# Patient Record
Sex: MALE | Race: ASIAN | NOT HISPANIC OR LATINO | URBAN - METROPOLITAN AREA
[De-identification: names, ages, dates, MRNs, and addresses within clinical notes are randomized per-mention and may not be internally consistent; named-entity substitution may affect disease eponyms.]

---

## 2023-04-23 ENCOUNTER — INPATIENT (INPATIENT)
Facility: HOSPITAL | Age: 68
LOS: 0 days | Discharge: HOME | DRG: 312 | End: 2023-04-24
Attending: STUDENT IN AN ORGANIZED HEALTH CARE EDUCATION/TRAINING PROGRAM | Admitting: STUDENT IN AN ORGANIZED HEALTH CARE EDUCATION/TRAINING PROGRAM
Payer: SELF-PAY

## 2023-04-23 VITALS
RESPIRATION RATE: 17 BRPM | HEIGHT: 68 IN | HEART RATE: 60 BPM | SYSTOLIC BLOOD PRESSURE: 139 MMHG | OXYGEN SATURATION: 97 % | WEIGHT: 169.98 LBS | DIASTOLIC BLOOD PRESSURE: 65 MMHG | TEMPERATURE: 98 F

## 2023-04-23 DIAGNOSIS — R55 SYNCOPE AND COLLAPSE: ICD-10-CM

## 2023-04-23 LAB
ALBUMIN SERPL ELPH-MCNC: 4.1 G/DL — SIGNIFICANT CHANGE UP (ref 3.3–5)
ALP SERPL-CCNC: 96 U/L — SIGNIFICANT CHANGE UP (ref 40–120)
ALT FLD-CCNC: 9 U/L — LOW (ref 10–45)
ANION GAP SERPL CALC-SCNC: 9 MMOL/L — SIGNIFICANT CHANGE UP (ref 5–17)
APPEARANCE UR: CLEAR — SIGNIFICANT CHANGE UP
APTT BLD: 23.5 SEC — LOW (ref 27.5–35.5)
AST SERPL-CCNC: 26 U/L — SIGNIFICANT CHANGE UP (ref 10–40)
BACTERIA # UR AUTO: NEGATIVE — SIGNIFICANT CHANGE UP
BASOPHILS # BLD AUTO: 0.04 K/UL — SIGNIFICANT CHANGE UP (ref 0–0.2)
BASOPHILS NFR BLD AUTO: 0.6 % — SIGNIFICANT CHANGE UP (ref 0–2)
BILIRUB SERPL-MCNC: 0.8 MG/DL — SIGNIFICANT CHANGE UP (ref 0.2–1.2)
BILIRUB UR-MCNC: NEGATIVE — SIGNIFICANT CHANGE UP
BLD GP AB SCN SERPL QL: NEGATIVE — SIGNIFICANT CHANGE UP
BUN SERPL-MCNC: 22 MG/DL — SIGNIFICANT CHANGE UP (ref 7–23)
CALCIUM SERPL-MCNC: 9.5 MG/DL — SIGNIFICANT CHANGE UP (ref 8.4–10.5)
CHLORIDE SERPL-SCNC: 102 MMOL/L — SIGNIFICANT CHANGE UP (ref 96–108)
CO2 SERPL-SCNC: 27 MMOL/L — SIGNIFICANT CHANGE UP (ref 22–31)
COLOR SPEC: SIGNIFICANT CHANGE UP
CREAT SERPL-MCNC: 0.78 MG/DL — SIGNIFICANT CHANGE UP (ref 0.5–1.3)
DIFF PNL FLD: NEGATIVE — SIGNIFICANT CHANGE UP
EGFR: 97 ML/MIN/1.73M2 — SIGNIFICANT CHANGE UP
EOSINOPHIL # BLD AUTO: 0.1 K/UL — SIGNIFICANT CHANGE UP (ref 0–0.5)
EOSINOPHIL NFR BLD AUTO: 1.4 % — SIGNIFICANT CHANGE UP (ref 0–6)
EPI CELLS # UR: 1 /HPF — SIGNIFICANT CHANGE UP
GLUCOSE SERPL-MCNC: 112 MG/DL — HIGH (ref 70–99)
GLUCOSE UR QL: NEGATIVE — SIGNIFICANT CHANGE UP
HCT VFR BLD CALC: 47.4 % — SIGNIFICANT CHANGE UP (ref 39–50)
HGB BLD-MCNC: 15.6 G/DL — SIGNIFICANT CHANGE UP (ref 13–17)
HYALINE CASTS # UR AUTO: 1 /LPF — SIGNIFICANT CHANGE UP (ref 0–2)
IMM GRANULOCYTES NFR BLD AUTO: 0.4 % — SIGNIFICANT CHANGE UP (ref 0–0.9)
INR BLD: 0.95 RATIO — SIGNIFICANT CHANGE UP (ref 0.88–1.16)
KETONES UR-MCNC: SIGNIFICANT CHANGE UP
LEUKOCYTE ESTERASE UR-ACNC: NEGATIVE — SIGNIFICANT CHANGE UP
LYMPHOCYTES # BLD AUTO: 2.98 K/UL — SIGNIFICANT CHANGE UP (ref 1–3.3)
LYMPHOCYTES # BLD AUTO: 42.9 % — SIGNIFICANT CHANGE UP (ref 13–44)
MCHC RBC-ENTMCNC: 31.5 PG — SIGNIFICANT CHANGE UP (ref 27–34)
MCHC RBC-ENTMCNC: 32.9 GM/DL — SIGNIFICANT CHANGE UP (ref 32–36)
MCV RBC AUTO: 95.8 FL — SIGNIFICANT CHANGE UP (ref 80–100)
MONOCYTES # BLD AUTO: 0.41 K/UL — SIGNIFICANT CHANGE UP (ref 0–0.9)
MONOCYTES NFR BLD AUTO: 5.9 % — SIGNIFICANT CHANGE UP (ref 2–14)
NEUTROPHILS # BLD AUTO: 3.38 K/UL — SIGNIFICANT CHANGE UP (ref 1.8–7.4)
NEUTROPHILS NFR BLD AUTO: 48.8 % — SIGNIFICANT CHANGE UP (ref 43–77)
NITRITE UR-MCNC: NEGATIVE — SIGNIFICANT CHANGE UP
NRBC # BLD: 0 /100 WBCS — SIGNIFICANT CHANGE UP (ref 0–0)
PH UR: 6.5 — SIGNIFICANT CHANGE UP (ref 5–8)
PLATELET # BLD AUTO: 173 K/UL — SIGNIFICANT CHANGE UP (ref 150–400)
POTASSIUM SERPL-MCNC: 4.4 MMOL/L — SIGNIFICANT CHANGE UP (ref 3.5–5.3)
POTASSIUM SERPL-SCNC: 4.4 MMOL/L — SIGNIFICANT CHANGE UP (ref 3.5–5.3)
PROT SERPL-MCNC: 7.3 G/DL — SIGNIFICANT CHANGE UP (ref 6–8.3)
PROT UR-MCNC: ABNORMAL
PROTHROM AB SERPL-ACNC: 11 SEC — SIGNIFICANT CHANGE UP (ref 10.5–13.4)
RBC # BLD: 4.95 M/UL — SIGNIFICANT CHANGE UP (ref 4.2–5.8)
RBC # FLD: 13.3 % — SIGNIFICANT CHANGE UP (ref 10.3–14.5)
RBC CASTS # UR COMP ASSIST: 2 /HPF — SIGNIFICANT CHANGE UP (ref 0–4)
RH IG SCN BLD-IMP: POSITIVE — SIGNIFICANT CHANGE UP
SODIUM SERPL-SCNC: 138 MMOL/L — SIGNIFICANT CHANGE UP (ref 135–145)
SP GR SPEC: 1.02 — SIGNIFICANT CHANGE UP (ref 1.01–1.02)
UROBILINOGEN FLD QL: NEGATIVE — SIGNIFICANT CHANGE UP
WBC # BLD: 6.94 K/UL — SIGNIFICANT CHANGE UP (ref 3.8–10.5)
WBC # FLD AUTO: 6.94 K/UL — SIGNIFICANT CHANGE UP (ref 3.8–10.5)
WBC UR QL: 2 /HPF — SIGNIFICANT CHANGE UP (ref 0–5)

## 2023-04-23 PROCEDURE — 71045 X-RAY EXAM CHEST 1 VIEW: CPT | Mod: 26

## 2023-04-23 PROCEDURE — 72170 X-RAY EXAM OF PELVIS: CPT | Mod: 26

## 2023-04-23 PROCEDURE — 99285 EMERGENCY DEPT VISIT HI MDM: CPT

## 2023-04-23 PROCEDURE — 70450 CT HEAD/BRAIN W/O DYE: CPT | Mod: 26,MA

## 2023-04-23 PROCEDURE — 70450 CT HEAD/BRAIN W/O DYE: CPT | Mod: 26,MA,77

## 2023-04-23 PROCEDURE — 72125 CT NECK SPINE W/O DYE: CPT | Mod: 26,MA

## 2023-04-23 PROCEDURE — 71260 CT THORAX DX C+: CPT | Mod: 26,MA

## 2023-04-23 PROCEDURE — 74177 CT ABD & PELVIS W/CONTRAST: CPT | Mod: 26,MA

## 2023-04-23 NOTE — ED ADULT NURSE NOTE - OBJECTIVE STATEMENT
Patient is a 68 year old male brought in by EMS for a reported syncope and fall with [place]. On arrival patient is A&Ox3, does not recall details about why he is here, unsure about his medical history, unclear if this is his baseline. Patient complaining of neck pain at this time. On assessment Neuro intact, breathing comfortably on room air, no accessory muscles being used, c-collar in place, abrasion noted to back of head - no active bleeding, moving all extremities well, abdomen is soft, skin is warm and dry. Patient denies headache, dizziness, chest pain, palpitations, cough, SOB, abdominal pain, n/v/d, urinary symptoms, fevers, chills, weakness at this time. Blood sugar 97.

## 2023-04-23 NOTE — CONSULT NOTE ADULT - SUBJECTIVE AND OBJECTIVE BOX
Meliton, Lamhi  68M no AC/AP, hx of Parkinson's p/w syncope and LOC, with CT head showing small interhemisphic hyperdensity. PLT/Coags wnl. Exam: intact      --Anticoagulation--    T(C): 36.7 (04-23-23 @ 13:03), Max: 36.7 (04-23-23 @ 13:03)  HR: 88 (04-23-23 @ 19:18) (60 - 90)  BP: 168/72 (04-23-23 @ 19:18) (139/65 - 179/107)  RR: 19 (04-23-23 @ 19:18) (17 - 19)  SpO2: 98% (04-23-23 @ 19:18) (97% - 99%)  Wt(kg): --    Exam: AO3, PERRL, EOMI, ELIZONDO 5/5, SILT

## 2023-04-23 NOTE — ED PROVIDER NOTE - PHYSICAL EXAMINATION
CONSTITUTIONAL: Well-developed; well-nourished; in no acute distress.   SKIN: warm, dry  HEAD: Normocephalic; abrasion posterior head   EYES: no conjunctival injection. PERRL. EOMI   ENT: No nasal discharge; airway clear.  NECK: No JVD  CARD: S1, S2 normal; Regular rate and rhythm.   RESP: No wheezes, rales or rhonchi. Good air movement bilaterally.   ABD: soft ntnd, no guarding, no distention, no rigidity.   EXT:  No cyanosis or edema.   NEURO: AOx3. AOx3, CN III-XII intact. Motor strength 5/5 in all extremities. Sensation intact throughout.  PSYCH: Cooperative, appropriate.

## 2023-04-23 NOTE — ED PROVIDER NOTE - ATTENDING CONTRIBUTION TO CARE
Attending Statement (RULA Loaiza MD):    HPI: 68-year-old male presenting after reported syncope/loss of consciousness and falling backward, with head strike on the ground.  Arrives by EMS with c-collar in place.  Patient reportedly confused per EMS.  Upon arrival A&O x3.  Patient has no recollection of the event.  No history of cardiac disease.    Review of Systems:  -General: no fever   -ENT: no congestion  -Pulmonary: no cough, no shortness of breath  -Cardiac: no chest pain; + syncope  -Gastrointestinal: no abdominal pain, no nausea, no vomiting, and no diarrhea.  -Genitourinary: no blood or pain with urination  -Musculoskeletal: no back or neck pain  -Skin: no rashes  -Endocrine: No h/o diabetes  -Neurologic: No new weakness or numbness in extremities    All else negative unless otherwise specified elsewhere in this note.    On Physical Exam:  General: well appearing, in NAD, speaking clearly in full sentences and without difficulty; cooperative with exam  HEENT: anicteric sclera, airway patent  Neck: no JVD  Cardiac: regular, s1 s2  Lungs: CTABL  Abdomen: soft nontender/nondistended  : no bladder tenderness or distension  Skin: intact, no rash  Extremities: no peripheral edema, no gross deformities      MDM: 68M presenting after reported syncope and fall with + head trauma; patient does not recall events; cannot report medical history or medications; but is A&Ox3 though having trouble giving details regarding his history; unclear if this is baseline, no collateral information; attempted/offered Cantonese intepreter but declined. Will obtain CT head/c-spine, chest/abdomen/pelvis to evaluate fo traumatic injuries, and labs: cbc (to evaluate for leukocytosis or anemia), CMP (to evaluate for electrolyte abnormalities or renal/liver dysfunction), troponin (eval for acs), and ua. Disposition pending review of labs/imaging. Please see above progress notes above for updates to medical decision making and the patient's clinical course.

## 2023-04-23 NOTE — ED ADULT NURSE NOTE - NSIMPLEMENTINTERV_GEN_ALL_ED
Implemented All Fall Risk Interventions:  [place] to call system. Call bell, personal items and telephone within reach. Instruct patient to call for assistance. Room bathroom lighting operational. Non-slip footwear when patient is off stretcher. Physically safe environment: no spills, clutter or unnecessary equipment. Stretcher in lowest position, wheels locked, appropriate side rails in place. Provide visual cue, wrist band, yellow gown, etc. Monitor gait and stability. Monitor for mental status changes and reorient to person, place, and time. Review medications for side effects contributing to fall risk. Reinforce activity limits and safety measures with patient and family.

## 2023-04-23 NOTE — ED PROVIDER NOTE - OBJECTIVE STATEMENT
Hazel DO PGY-3:  68-year-old male brought in by EMS secondary to a witnessed fall on the street.  Positive LOC.  Patient arrives awake and alert.  Patient is unsure of his medical history or what medications he takes daily.  Patient does not know the contact information of any of his family.  Blood glucose normal on evaluation.  Patient arrives in c-collar.

## 2023-04-23 NOTE — CONSULT NOTE ADULT - ASSESSMENT
Meliton Maribell  68M no AC/AP, hx of Parkinson's p/w syncope and LOC, with CT head showing small interhemisphic hyperdensity. PLT/Coags wnl. Exam: intact    -admit to medicine for syncope w/u  -repeat CT head in 4 hours.   -If repeat CT stable, patient can FU with Dr. Best as outpatient in 1-2 weeks from [place]  -Ok to start DVT ppx 24 hrs after 12-24 hr stability scan

## 2023-04-23 NOTE — ED PROVIDER NOTE - PROGRESS NOTE DETAILS
O'Nima DO PGY-3: consulted neurosurg. Ordered interval scan at 650pm. O'Nima DO PGY-3: daughter at bedside. Updated her on plan. States she will stay w/ pt in ED Vinh Bishop, PGY-3- pt received at sign out. Repeat CT with stable findings. Pt at baseline. Walked with assistance, unsteady gait. Daughter reporting hx of Parkinson's and on meds from [facility]. Ok for daughter to give pt evening meds but advised no further meds without discussing w primary team. Pt to stay for syncope work up and PT eval. Pt and daughter in agreement

## 2023-04-23 NOTE — ED PROVIDER NOTE - CLINICAL SUMMARY MEDICAL DECISION MAKING FREE TEXT BOX
Hazel DO PGY-3:  68-year-old male brought in by EMS secondary to a witnessed fall on the street.  Positive LOC.  Patient arrives awake and alert.  Patient is unsure of his medical history or what medications he takes daily.  Patient does not know the contact information of any of his family.  Given the lack of prior medical history information or collateral available will obtain pan trauma scan.  We will screen for electrolyte abnormalities.  We will send off cardiac lab screening given syncope.  Dispo pending work-up.

## 2023-04-24 VITALS
HEART RATE: 76 BPM | DIASTOLIC BLOOD PRESSURE: 79 MMHG | RESPIRATION RATE: 18 BRPM | TEMPERATURE: 98 F | SYSTOLIC BLOOD PRESSURE: 162 MMHG | OXYGEN SATURATION: 96 %

## 2023-04-24 DIAGNOSIS — G20 PARKINSON'S DISEASE: ICD-10-CM

## 2023-04-24 DIAGNOSIS — Z29.9 ENCOUNTER FOR PROPHYLACTIC MEASURES, UNSPECIFIED: ICD-10-CM
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
## 2023-04-24 DIAGNOSIS — R55 SYNCOPE AND COLLAPSE: ICD-10-CM

## 2023-04-24 LAB
ANION GAP SERPL CALC-SCNC: 15 MMOL/L — SIGNIFICANT CHANGE UP (ref 5–17)
BUN SERPL-MCNC: 21 MG/DL — SIGNIFICANT CHANGE UP (ref 7–23)
CALCIUM SERPL-MCNC: 9.5 MG/DL — SIGNIFICANT CHANGE UP (ref 8.4–10.5)
CHLORIDE SERPL-SCNC: 99 MMOL/L — SIGNIFICANT CHANGE UP (ref 96–108)
CO2 SERPL-SCNC: 24 MMOL/L — SIGNIFICANT CHANGE UP (ref 22–31)
CREAT SERPL-MCNC: 0.95 MG/DL — SIGNIFICANT CHANGE UP (ref 0.5–1.3)
CULTURE RESULTS: SIGNIFICANT CHANGE UP
EGFR: 87 ML/MIN/1.73M2 — SIGNIFICANT CHANGE UP
GLUCOSE SERPL-MCNC: 87 MG/DL — SIGNIFICANT CHANGE UP (ref 70–99)
INR BLD: 1.01 RATIO — SIGNIFICANT CHANGE UP (ref 0.88–1.16)
POTASSIUM SERPL-MCNC: 3.6 MMOL/L — SIGNIFICANT CHANGE UP (ref 3.5–5.3)
POTASSIUM SERPL-SCNC: 3.6 MMOL/L — SIGNIFICANT CHANGE UP (ref 3.5–5.3)
PROTHROM AB SERPL-ACNC: 11.6 SEC — SIGNIFICANT CHANGE UP (ref 10.5–13.4)
SODIUM SERPL-SCNC: 138 MMOL/L — SIGNIFICANT CHANGE UP (ref 135–145)
SPECIMEN SOURCE: SIGNIFICANT CHANGE UP
TSH SERPL-MCNC: 0.76 UIU/ML — SIGNIFICANT CHANGE UP (ref 0.27–4.2)

## 2023-04-24 PROCEDURE — 86901 BLOOD TYPING SEROLOGIC RH(D): CPT

## 2023-04-24 PROCEDURE — 99223 1ST HOSP IP/OBS HIGH 75: CPT

## 2023-04-24 PROCEDURE — 84484 ASSAY OF TROPONIN QUANT: CPT

## 2023-04-24 PROCEDURE — 72125 CT NECK SPINE W/O DYE: CPT | Mod: MA

## 2023-04-24 PROCEDURE — 71260 CT THORAX DX C+: CPT | Mod: MA

## 2023-04-24 PROCEDURE — 81001 URINALYSIS AUTO W/SCOPE: CPT

## 2023-04-24 PROCEDURE — 12345: CPT | Mod: NC

## 2023-04-24 PROCEDURE — 93306 TTE W/DOPPLER COMPLETE: CPT

## 2023-04-24 PROCEDURE — 83880 ASSAY OF NATRIURETIC PEPTIDE: CPT

## 2023-04-24 PROCEDURE — 72170 X-RAY EXAM OF PELVIS: CPT

## 2023-04-24 PROCEDURE — 86850 RBC ANTIBODY SCREEN: CPT

## 2023-04-24 PROCEDURE — 93306 TTE W/DOPPLER COMPLETE: CPT | Mod: 26

## 2023-04-24 PROCEDURE — 99285 EMERGENCY DEPT VISIT HI MDM: CPT

## 2023-04-24 PROCEDURE — 85025 COMPLETE CBC W/AUTO DIFF WBC: CPT

## 2023-04-24 PROCEDURE — 80048 BASIC METABOLIC PNL TOTAL CA: CPT

## 2023-04-24 PROCEDURE — 85610 PROTHROMBIN TIME: CPT

## 2023-04-24 PROCEDURE — 70450 CT HEAD/BRAIN W/O DYE: CPT | Mod: MA

## 2023-04-24 PROCEDURE — 70450 CT HEAD/BRAIN W/O DYE: CPT | Mod: 26

## 2023-04-24 PROCEDURE — 80053 COMPREHEN METABOLIC PANEL: CPT

## 2023-04-24 PROCEDURE — 85730 THROMBOPLASTIN TIME PARTIAL: CPT

## 2023-04-24 PROCEDURE — 87086 URINE CULTURE/COLONY COUNT: CPT

## 2023-04-24 PROCEDURE — 86900 BLOOD TYPING SEROLOGIC ABO: CPT

## 2023-04-24 PROCEDURE — 82962 GLUCOSE BLOOD TEST: CPT

## 2023-04-24 PROCEDURE — 97161 PT EVAL LOW COMPLEX 20 MIN: CPT

## 2023-04-24 PROCEDURE — 93356 MYOCRD STRAIN IMG SPCKL TRCK: CPT

## 2023-04-24 PROCEDURE — 84443 ASSAY THYROID STIM HORMONE: CPT

## 2023-04-24 PROCEDURE — 74177 CT ABD & PELVIS W/CONTRAST: CPT | Mod: MA

## 2023-04-24 PROCEDURE — 71045 X-RAY EXAM CHEST 1 VIEW: CPT

## 2023-04-24 RX ORDER — PRAMIPEXOLE DIHYDROCHLORIDE 0.12 MG/1
3 TABLET ORAL DAILY
Refills: 0 | Status: DISCONTINUED | OUTPATIENT
Start: 2023-04-24 | End: 2023-04-24

## 2023-04-24 RX ORDER — SIMVASTATIN 20 MG/1
1 TABLET, FILM COATED ORAL
Refills: 0 | DISCHARGE

## 2023-04-24 RX ORDER — LANOLIN ALCOHOL/MO/W.PET/CERES
3 CREAM (GRAM) TOPICAL AT BEDTIME
Refills: 0 | Status: DISCONTINUED | OUTPATIENT
Start: 2023-04-24 | End: 2023-04-24

## 2023-04-24 RX ORDER — CARBIDOPA AND LEVODOPA 25; 100 MG/1; MG/1
1 TABLET ORAL
Refills: 0 | Status: DISCONTINUED | OUTPATIENT
Start: 2023-04-24 | End: 2023-04-24

## 2023-04-24 RX ORDER — ACETAMINOPHEN 500 MG
650 TABLET ORAL EVERY 6 HOURS
Refills: 0 | Status: DISCONTINUED | OUTPATIENT
Start: 2023-04-24 | End: 2023-04-24

## 2023-04-24 RX ORDER — ATORVASTATIN CALCIUM 80 MG/1
40 TABLET, FILM COATED ORAL AT BEDTIME
Refills: 0 | Status: DISCONTINUED | OUTPATIENT
Start: 2023-04-24 | End: 2023-04-24

## 2023-04-24 RX ORDER — AMLODIPINE BESYLATE 2.5 MG/1
2.5 TABLET ORAL DAILY
Refills: 0 | Status: DISCONTINUED | OUTPATIENT
Start: 2023-04-24 | End: 2023-04-24

## 2023-04-24 RX ORDER — ENOXAPARIN SODIUM 100 MG/ML
40 INJECTION SUBCUTANEOUS EVERY 24 HOURS
Refills: 0 | Status: DISCONTINUED | OUTPATIENT
Start: 2023-04-24 | End: 2023-04-24

## 2023-04-24 RX ORDER — ONDANSETRON 8 MG/1
4 TABLET, FILM COATED ORAL EVERY 8 HOURS
Refills: 0 | Status: DISCONTINUED | OUTPATIENT
Start: 2023-04-24 | End: 2023-04-24

## 2023-04-24 RX ORDER — PRAMIPEXOLE DIHYDROCHLORIDE 0.12 MG/1
1 TABLET ORAL THREE TIMES A DAY
Refills: 0 | Status: DISCONTINUED | OUTPATIENT
Start: 2023-04-24 | End: 2023-04-24

## 2023-04-24 RX ORDER — PRAMIPEXOLE DIHYDROCHLORIDE 0.12 MG/1
2 TABLET ORAL
Refills: 0 | DISCHARGE

## 2023-04-24 RX ADMIN — CARBIDOPA AND LEVODOPA 1 TABLET(S): 25; 100 TABLET ORAL at 11:08

## 2023-04-24 RX ADMIN — PRAMIPEXOLE DIHYDROCHLORIDE 1 MILLIGRAM(S): 0.12 TABLET ORAL at 13:28

## 2023-04-24 RX ADMIN — AMLODIPINE BESYLATE 2.5 MILLIGRAM(S): 2.5 TABLET ORAL at 06:41

## 2023-04-24 NOTE — PHYSICAL THERAPY INITIAL EVALUATION ADULT - GAIT DEVIATIONS NOTED, PT EVAL
progressive increase in step length and reduced shuffling (h/o parkinson's), walking and talking, no LOB, safe/steady gait

## 2023-04-24 NOTE — PHYSICAL THERAPY INITIAL EVALUATION ADULT - GENERAL OBSERVATIONS, REHAB EVAL
pt shyla 30 min eval well. pt rec'd in stretcher, tele, NAD, agreed to session, following all commands. orthostatics assessed (see vitals sheet). see initial evaluation document for functional assessment. pt ambulated well around unit without any device. pt without c/o chest pain, SOB, or dizziness. Pt left in stretcher, rails up, RN aware, NAD, all lines intact, cb in reach, all needs met/5Ps.

## 2023-04-24 NOTE — PHYSICAL THERAPY INITIAL EVALUATION ADULT - ADDITIONAL COMMENTS
pt states he lives in the apartment of a private home with spouse with a flight of steps (+handrail). Pt states prior to admission being independent with all functional mobility and ADLs. pt denies owning any DME.

## 2023-04-24 NOTE — H&P ADULT - PROBLEM SELECTOR PLAN 3
- c/w Pramipexole  - Madopar ( home med, non formulary)   >> Will given Carbidopa-Levodopa while admitted - c/w Pramipexole  - Madopar ( home med, non formulary)   >> Will give Carbidopa-Levodopa while admitted      Pt noted to have persistently elevated BP O/N no reported hx/o HTN, will start Amlodipine 2.5mg

## 2023-04-24 NOTE — H&P ADULT - PROBLEM SELECTOR PLAN 1
Presents s/p witnessed fall with +LOC   - Possible etiology: orthostatic vs cardiac dz vs electrolyte abnorm vs neuro dz vs infection  - EKG: NSR w/ 1st AVB  - Orthostatic: Check orthostatic bp, maintain adequate hydration, change position slowly  - Cardiac: trop & BNP wnl, tele monitor to asses arrythmia, check echo to eval  structural  or valvular abnormalities (ordered). Will also check tsh   - Electrolyte: review of electrolytes largely wnl except, less likely etiology   - Neuro:  no focal neuro deficit, CT head notable for midline focus , possible small acute hemorrhage   vs meningioma. Findings stable on repeat imaging.  Loss suspicion for ICH, monitor clinically, neuro check   - Infection: Afebrile, no leukocytosis, UA non infectious, CXR clear, infection unlikely Presents s/p witnessed fall with +LOC   - Possible etiology: orthostatic vs cardiac dz vs electrolyte abnorm vs neuro dz vs infection  - EKG: NSR w/ 1st AVB  - Orthostatic: Check orthostatic bp, maintain adequate hydration, change position slowly  - Cardiac: trop & BNP wnl, tele monitor to asses arrythmia, check echo to eval  structural  or valvular abnormalities (ordered). Will also check tsh   - Electrolyte: review of electrolytes largely wnl except, less likely etiology   - Neuro:  no focal neuro deficit, CT head notable for midline focus , possible small acute hemorrhage   vs meningioma. Findings stable on repeat imaging.  Loss suspicion for ICH, monitor clinically, neuro check   - Infection: Afebrile, no leukocytosis, UA noninfectious, CXR clear, infection unlikely Presents s/p witnessed fall with +LOC   - Possible etiology: orthostatic vs cardiac dz vs electrolyte abnorm vs neuro dz vs infection  - EKG: NSR w/ 1st AVB  - Orthostatic: Check orthostatic bp, maintain adequate hydration, change position slowly  - Cardiac: trop & BNP wnl, tele monitor to asses arrythmia, check echo to eval  structural  or valvular abnormalities (ordered). Will also check tsh   - Electrolyte: review of electrolytes largely wnl except, less likely etiology   - Neuro:  no focal neuro deficit, CT head notable for midline focus , possible small acute hemorrhage   vs meningioma. Findings stable on repeat imaging.  Low suspicion for ICH, monitor clinically, neuro check   Neuro Surg  consulted, apprec rec; Ok to start DVT ppx 24 hrs after 12-24 hr stability scan.    - Infection: Afebrile, no leukocytosis, UA noninfectious, CXR clear, infection unlikely

## 2023-04-24 NOTE — H&P ADULT - HISTORY OF PRESENT ILLNESS
68y M pmh ? Parkinson BIBEMS s/p witnessed fall on the street w/ associated  LOC.  On arrival to ED patient awake and alert. Bg wnl, C-collar in place      68y M pmh Parkinson BIBEMS s/p witnessed fall on the street w/ associated  LOC.  Pt stating it  "it happened suddenly".   On arrival to ED patient awake and alert, offers no compliant.    Denies CP, SOB, palpitation, HA, N/V/D, fever, cough, chills, dizziness, abm pain

## 2023-04-24 NOTE — DISCHARGE NOTE PROVIDER - HOSPITAL COURSE
68y M pmh Parkinson BIBEMS s/p witnessed fall on the street w/ associated  LOC admitted for eval.  Admitted with syncopal episode.  CT Head possible small acute hemorrhage vs meningioma/ repeat CT stable.  Limited echo-WNL.  Pt also with hypertension: Norvasc started. Seen by PT.  No skilled needs.  Acute issues resolved.  DC to home.

## 2023-04-24 NOTE — PHYSICAL THERAPY INITIAL EVALUATION ADULT - NSPTDISCHREC_GEN_A_CORE
pt cleared for DC from PT standpoint. please reconsult as appropriate/if status changes/No skilled PT needs

## 2023-04-24 NOTE — H&P ADULT - PROBLEM SELECTOR PLAN 2
S/p witnessed fall in street w/ +LOC  - Possible debility given baseline ??Parkinson DZ  - Fall precaution   - PT consult  (ordered)   - Further w/u as above S/p witnessed fall in street w/ +LOC  - Possible debility given baseline Parkinson DZ  - Fall precaution   - PT consult  (ordered)   - Further w/u as above S/p witnessed fall in street w/ +LOC  - CT head notable for midline focus , possible small acute hemorrhage   vs meningioma. Findings stable on repeat imaging.  Loss suspicion for ICH, monitor clinically, neuro check   - Neuro Surg  consulted, apprec rec; Ok to start DVT ppx 24 hrs after 12-24 hr stability scan.  - Repeat CT head ordered (TO BE  PERFORMED AFTER 9AM on 4/24)   - Possible debility given baseline Parkinson Dz  - Fall precaution   - PT consult  (ordered)   - Further w/u as above

## 2023-04-24 NOTE — DISCHARGE NOTE NURSING/CASE MANAGEMENT/SOCIAL WORK - PATIENT PORTAL LINK FT
You can access the FollowMyHealth Patient Portal offered by [facility] by registering at the following website: [email or web address]. By joining [facility]’s FollowMyHealth portal, you will also be able to view your health information using other applications (apps) compatible with our system.

## 2023-04-24 NOTE — H&P ADULT - PROBLEM SELECTOR PLAN 4
- DVT ppx: Lovenox SQ  - Diet: DASH  - PT consult - DVT ppx: held for  now, to be resumed 24hr after stability scan   - Diet: DASH  - PT consult

## 2023-04-24 NOTE — CHART NOTE - NSCHARTNOTEFT_GEN_A_CORE
SECOND TOUCH SECOND TOUCH    Appreciate excellent care provided by briana.    aMrilu : [phone]    Patient seen and examined at bedside. No acute events overnight. No complaints currently. Unable to further provide background into the circumstances of fall. Unsure if there were prodromal symptoms prior to fall or if this was mechanical and they he lost consciousness on impact. He does have history of this happening in [facility] and was hospitalized there without major findings (per daughter Roro patient had MRI brain). Orthostatics negative here. Patient without complaints right now. TTE with only mild regurgitation. No events on tele. EKG with motion artifact, but no major ST changes. Further hospitalization is low yield. I discussed this with daughter Roro. Plan for discharge with outpatient follow up. Serial CT head without change, possible meningioma of which patient will also follow up outpatient.    PT saw and no needs.    Discussed with daughter if does recur may require further aggressive work up. Doubt seizure or heart block at this time. All questions and concerns answered. For now we will have to presume this was a mechanical fall with subsequent loss of consciousness. Fall precaution recommended. Supervision also recommended. SECOND TOUCH    Appreciate excellent care provided by briana.    Marilu : [id number]    Patient seen and examined at bedside. No acute events overnight. No complaints currently. Unable to further provide background into the circumstances of fall. Unsure if there were prodromal symptoms prior to fall or if this was mechanical and they he lost consciousness on impact. He does have history of this happening in [facility] and was hospitalized there without major findings (per daughter Roro patient had MRI brain). Orthostatics negative here. Patient without complaints right now. TTE with only mild regurgitation. No events on tele. EKG with motion artifact, but no major ST changes. Further hospitalization is low yield. I discussed this with daughter Roro. Plan for discharge with outpatient follow up. Serial CT head without change, possible meningioma of which patient will also follow up outpatient.    PT saw and no needs.    Discussed with daughter if does recur may require further aggressive work up. Doubt seizure or heart block at this time. All questions and concerns answered. For now we will have to presume this was a mechanical fall with subsequent loss of consciousness. Fall precaution recommended. Supervision also recommended.    Outpatient follow up for BP management.

## 2023-04-24 NOTE — H&P ADULT - NSHPLABSRESULTS_GEN_ALL_CORE
15.6   6.94  )-----------( 173      ( 23 Apr 2023 13:34 )             47.4       04-23    138  |  102  |  22  ----------------------------<  112<H>  4.4   |  27  |  0.78    Ca    9.5      23 Apr 2023 13:34    TPro  7.3  /  Alb  4.1  /  TBili  0.8  /  DBili  x   /  AST  26  /  ALT  9<L>  /  AlkPhos  96  04-23      Troponin T, High Sensitivity Result: 7: Specimen not hemolyzed (04.23.23 @ 13:34)    Pro-Brain Natriuretic Peptide: 111 pg/mL (04.23.23 @ 13:34)    Urinalysis + Microscopic Examination (04.23.23 @ 14:23)    pH Urine: 6.5    Urine Appearance: Clear    Color: Light Yellow    Specific Gravity: 1.022    Protein, Urine: 100 mg/dl    Glucose Qualitative, Urine: Negative    Ketone - Urine: Trace    Blood, Urine: Negative    Bilirubin: Negative    Urobilinogen: Negative    Leukocyte Esterase Concentration: Negative    Nitrite: Negative    White Blood Cell - Urine: 2 /HPF    Red Blood Cell - Urine: 2 /hpf    Bacteria: Negative    Hyaline Casts: 1 /lpf    Squamous Epithelial Cells: 1 /hpf      EKG personally reviewed:  NSR 65 bpm, 1st AVB     Images personally reviewed:     < from: Xray Chest 1 View- PORTABLE-Urgent (Xray Chest 1 View- PORTABLE-Urgent .) (04.23.23 @ 14:14) >  FINDINGS:  The heart is enlarged in size.  The lungs are clear.  There is no pneumothorax or pleural effusion.  No acute displaced rib fractures.    < from: Xray Pelvis AP only (04.23.23 @ 14:15) >  IMPRESSION:  No acute displaced fracture or dislocation.  The sacroiliac joints and pubic symphysis remain intact.  Intact pelvic and obturator rings.    < from: CT Chest w/ IV Cont (04.23.23 @ 15:32) >  IMPRESSION:  No acute traumatic findings to the chest, abdomen and pelvis.    Mild intrahepatic biliary ductal dilatation with common bile duct   measuring up to 1.5 cm, probably related to postcholecystectomy state.      < from: CT Head No Cont (04.23.23 @ 15:34) >  IMPRESSION:  BRAIN CT :Trace focus of high attenuation in the interhemispheric region   may represent small focus of acute hemorrhage. Possibility of a   meningioma at this level is a consideration as well. Follow-up imaging   recommended  CERVICAL SPINE CT: Prominent OPLL as described causing narrowing of the   spinal canal in the cervical region. No acute fracture or traumatic   subluxation.    < from: CT Head No Cont (04.23.23 @ 20:44) >  IMPRESSION:  Stable exam compared to earlier same day CT head. Similar   midline focus of high attenuation abutting the falx cerebri which may be   hemorrhage versus meningioma.

## 2023-04-24 NOTE — H&P ADULT - NSHPPHYSICALEXAM_GEN_ALL_CORE
T(C): 37 (04-24-23 @ 01:58), Max: 37 (04-24-23 @ 01:58)  HR: 80 (04-24-23 @ 01:58) (60 - 90)  BP: 186/76 (04-24-23 @ 01:58) (132/69 - 186/76)  RR: 18 (04-24-23 @ 01:58) (17 - 19)  SpO2: 94% (04-24-23 @ 01:58) (94% - 99%)    CONSTITUTIONAL: Well groomed, no apparent distress  EYES: PERRLA and symmetric, EOMI  ENMT: MMM. Normal dentition  RESP: No respiratory distress, no use of accessory muscles; CTA b/l, no WRR  CV: +S1S2, RRR, no MRG; no peripheral edema  GI: Soft, NTND, no RGR; no palpable masses  MSK: Normal gait; No digital clubbing or cyanosis; normal pain free ROM x4 extremities   SKIN: No rashes or ulcers noted  NEURO: CN II-XII grossly intact; normal reflexes, sensation intact throughout   PSYCH: Appropriate insight/judgment; A+O x 3, mood and affect appropriate

## 2023-04-24 NOTE — ED ADULT NURSE REASSESSMENT NOTE - NS ED NURSE REASSESS COMMENT FT1
ACP Hui Franz contacted at [phone] and made aware family member want to leave AMA. ACP states she will work on AMA process.
KIM Franz at bedside.
Valuables secured with red desk in [facility] ED
No

## 2023-04-24 NOTE — H&P ADULT - ASSESSMENT
68y M pmh ? Parkinson BIBEMS s/p witnessed fall on the street w/ associated  LOC admitted for eval of ?syncopal episode  68y M pmh Parkinson BIBEMS s/p witnessed fall on the street w/ associated  LOC admitted for eval of ?syncopal episode

## 2023-04-24 NOTE — DISCHARGE NOTE PROVIDER - NSDCMRMEDTOKEN_GEN_ALL_CORE_FT
Madopar 250mg take 2.5tab q4hr:   pramipexole 1.5 mg oral tablet, extended release: 2 tab(s) orally once a day  simvastatin 10 mg oral tablet: 1 orally

## 2023-04-24 NOTE — DISCHARGE NOTE PROVIDER - NSDCCPCAREPLAN_GEN_ALL_CORE_FT
PRINCIPAL DISCHARGE DIAGNOSIS  Diagnosis: Syncope  Assessment and Plan of Treatment: HOME CARE INSTRUCTIONS  Have someone stay with you until you feel stable.  Do not drive, operate machinery, or play sports until your caregiver says it is okay.  Keep all follow-up appointments as directed by your caregiver.   Lie down right away if you start feeling like you might faint. Breathe deeply and steadily. Wait until all the symptoms have passed.Drink enough fluids to keep your urine clear or pale yellow.  If you are taking blood pressure or heart medicine, get up slowly, taking several minutes to sit and then stand. This can reduce dizziness.  SEEK IMMEDIATE MEDICAL CARE IF:  You have a severe headache.  You have unusual pain in the chest, abdomen, or back.  You are bleeding from the mouth or rectum, or you have black or tarry stool.  You have an irregular or very fast heartbeat.  You have pain with breathing.  You have repeated fainting or seizure-like jerking during an episode.  You faint when sitting or lying down.  You have confusion.  You have difficulty walking.  You have severe weakness.  You have vision problems.  If you fainted, call your local emergency services. Do not drive yourself to the hospital      SECONDARY DISCHARGE DIAGNOSES  Diagnosis: Unsteady gait  Assessment and Plan of Treatment: Stable  No needs per PT    Diagnosis: HTN (hypertension)  Assessment and Plan of Treatment: Low salt diet  Activity as tolerated.  Take all medication as prescribed.  Follow up with your medical doctor for routine blood pressure monitoring at your next visit.  Notify your doctor if you have any of the following symptoms:   Dizziness, Lightheadedness, Blurry vision, Headache, Chest pain, Shortness of breath

## 2023-04-24 NOTE — PHYSICAL THERAPY INITIAL EVALUATION ADULT - PERTINENT HX OF CURRENT PROBLEM, REHAB EVAL
67 y/o M with h/o Parkinson's disease BIBEMS s/p witnessed fall on the street with associated LOC. Pt states "it happened suddenly." CHEST XRAY (-). CT CHEST/ABDOMEN/PELVIS (-). PELVIC XRAY: (-). CT CERVICAL SPINE: prominent OPLL as described causing narrowing of the spinal canal in the cervical region. No acute fx or traumatic subluxation.

## 2023-04-24 NOTE — PHYSICAL THERAPY INITIAL EVALUATION ADULT - ACTIVE RANGE OF MOTION EXAMINATION, REHAB EVAL
bilateral upper extremity Active ROM was WFL (within functional limits)/bilateral  lower extremity Active ROM was WFL (within functional limits)
None known

## 2023-07-28 NOTE — ED ADULT TRIAGE NOTE - HEIGHT IN INCHES
8 [Fatigue] : fatigue [Joint Pain] : joint pain [Negative] : Heme/Lymph [Muscle Pain] : no muscle pain

## 2025-06-21 NOTE — H&P ADULT - NSCORESITESY/N_GEN_A_CORE_RD
Patient seen and examined  Remains intubated  on CRRT- no issues with the machine  On Epi, , and Vaso gtt    REVIEW OF SYSTEMS:  UTO    MEDICATIONS  (STANDING):  aMIOdarone    Tablet   Oral   aMIOdarone    Tablet 200 milliGRAM(s) Oral two times a day  ascorbic acid 500 milliGRAM(s) Oral two times a day  aspirin enteric coated 81 milliGRAM(s) Oral daily  atorvastatin 40 milliGRAM(s) Oral at bedtime  bisacodyl Suppository 10 milliGRAM(s) Rectal once  chlorhexidine 2% Cloths 1 Application(s) Topical daily  chlorhexidine 4% Liquid 1 Application(s) Topical daily  CRRT Treatment    <Continuous>  dextrose 5% + sodium chloride 0.9%. 1000 milliLiter(s) (50 mL/Hr) IV Continuous <Continuous>  dextrose 5%. 1000 milliLiter(s) (100 mL/Hr) IV Continuous <Continuous>  dextrose 50% Injectable 50 milliLiter(s) IV Push every 15 minutes  dextrose 50% Injectable 25 milliLiter(s) IV Push every 15 minutes  DOBUTamine Infusion 5 MICROgram(s)/kG/Min (11.9 mL/Hr) IV Continuous <Continuous>  EPINEPHrine    Infusion 0.03 MICROgram(s)/kG/Min (8.93 mL/Hr) IV Continuous <Continuous>  gabapentin 100 milliGRAM(s) Oral every 8 hours  heparin  Infusion 300 Unit(s)/Hr (7 mL/Hr) IV Continuous <Continuous>  insulin regular Infusion 3 Unit(s)/Hr (3 mL/Hr) IV Continuous <Continuous>  norepinephrine Infusion 0.05 MICROgram(s)/kG/Min (7.44 mL/Hr) IV Continuous <Continuous>  pantoprazole  Injectable 40 milliGRAM(s) IV Push daily  Phoxillum Filtration BK 4 / 2.5 5000 milliLiter(s) (800 mL/Hr) CRRT <Continuous>  polyethylene glycol 3350 17 Gram(s) Oral daily  PrismaSOL Filtration BGK 4 / 2.5 5000 milliLiter(s) (800 mL/Hr) CRRT <Continuous>  PrismaSOL Filtration BGK 4 / 2.5 5000 milliLiter(s) (800 mL/Hr) CRRT <Continuous>  senna 2 Tablet(s) Oral at bedtime  sodium bicarbonate 650 milliGRAM(s) Oral two times a day  sodium chloride 0.9%. 1000 milliLiter(s) (10 mL/Hr) IV Continuous <Continuous>  traZODone 25 milliGRAM(s) Oral at bedtime  vasopressin Infusion 0.04 Unit(s)/Min (6 mL/Hr) IV Continuous <Continuous>      VITAL:  T(C): , Max: 37.1 (25 @ 16:00)  T(F): , Max: 98.8 (25 @ 16:00)  HR: 94 (25 @ 07:30)  BP: --  BP(mean): --  RR: 12 (25 @ 07:30)  SpO2: 100% (25 @ 07:30)  Wt(kg): --    I and O's:     @ 07:01  -   @ 07:00  --------------------------------------------------------  IN: 1728.5 mL / OUT: 3427 mL / NET: -1698.5 mL          PHYSICAL EXAM:    Constitutional: critically ill   HEENT: PERRLA, EOMI,  MMM  Neck: No LAD, No JVD  Respiratory: CTAB  Cardiovascular: S1 and S2  Gastrointestinal: BS+, soft, NT/ND  Extremities: No peripheral edema  Neurological: A/O x 3, no focal deficits  : + Ag      LABS:                        8.3    10.54 )-----------( 74       ( 2025 00:54 )             26.2         135  |  101  |  16  ----------------------------<  112[H]  4.0   |  20[L]  |  2.17[H]    Ca    9.0      2025 00:53  Phos  3.3       Mg     2.6         TPro  6.1  /  Alb  3.4  /  TBili  0.6  /  DBili  x   /  AST  53[H]  /  ALT  69[H]  /  AlkPhos  140[H]        Urine Studies:  Urinalysis Basic - ( 2025 00:53 )    Color: x / Appearance: x / SG: x / pH: x  Gluc: 112 mg/dL / Ketone: x  / Bili: x / Urobili: x   Blood: x / Protein: x / Nitrite: x   Leuk Esterase: x / RBC: x / WBC x   Sq Epi: x / Non Sq Epi: x / Bacteria: x        · Assessment	  71 year old male PMH of HTN, HLD, DM2, CAD (total  4 coronary stents, last one PCI in 2024 &  S/p status post MI treated with PCI x3 stents in  & 2023)on Asprin 81 mg, Chronic back pain, ESRD on  HD 3x/week via Right brachial AV fistula ([id number], Nephrology- Dr.Paul Munguia-[facility], in [facility] CABG x 3 and 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